# Patient Record
Sex: FEMALE | Race: WHITE | URBAN - METROPOLITAN AREA
[De-identification: names, ages, dates, MRNs, and addresses within clinical notes are randomized per-mention and may not be internally consistent; named-entity substitution may affect disease eponyms.]

---

## 2022-07-20 ENCOUNTER — DOCUMENTATION ONLY (OUTPATIENT)
Dept: ANTICOAGULATION | Facility: CLINIC | Age: 87
End: 2022-07-20

## 2022-07-20 NOTE — PROGRESS NOTES
ANTICOAGULATION  MANAGEMENT    Nicky Ledesma is being discharged from the [facility] ([facility]).    Reason for discharge:     Anticoagulation episode resolved and [facility] referral closed        Cristina Mace RN